# Patient Record
Sex: FEMALE | Race: WHITE | NOT HISPANIC OR LATINO | URBAN - METROPOLITAN AREA
[De-identification: names, ages, dates, MRNs, and addresses within clinical notes are randomized per-mention and may not be internally consistent; named-entity substitution may affect disease eponyms.]

---

## 2023-05-12 ENCOUNTER — TELEPHONE (OUTPATIENT)
Dept: OBSTETRICS AND GYNECOLOGY | Facility: CLINIC | Age: 28
End: 2023-05-12
Payer: MEDICAID

## 2023-05-12 DIAGNOSIS — Z01.419 ENCOUNTER FOR ANNUAL ROUTINE GYNECOLOGICAL EXAMINATION: Primary | ICD-10-CM

## 2023-10-09 ENCOUNTER — TELEPHONE (OUTPATIENT)
Dept: OBSTETRICS AND GYNECOLOGY | Facility: CLINIC | Age: 28
End: 2023-10-09
Payer: MEDICAID

## 2023-10-09 NOTE — TELEPHONE ENCOUNTER
Phone call returned to patient.  She is requesting a New OB appointment.  She has Medicaid and is not established with Dr. Vincent  I advised patient Dr. Vincent is not accepting any new Medicaid patients at this time.  She is given three [facility] Provider names to call

## 2023-10-09 NOTE — TELEPHONE ENCOUNTER
----- Message from Chiqui Sommer MA sent at 10/9/2023  2:28 PM CDT -----  Contact: self    ----- Message -----  From: Corazon Phillips  Sent: 10/9/2023   2:08 PM CDT  To: Melissa Luu (Obgyn) Staff; #    Pt needs to schedule INT ob appt pls call [phone]

## 2023-10-11 ENCOUNTER — TELEPHONE (OUTPATIENT)
Dept: OBSTETRICS AND GYNECOLOGY | Facility: CLINIC | Age: 28
End: 2023-10-11
Payer: MEDICAID